# Patient Record
Sex: FEMALE | Race: BLACK OR AFRICAN AMERICAN | NOT HISPANIC OR LATINO | Employment: FULL TIME | URBAN - METROPOLITAN AREA
[De-identification: names, ages, dates, MRNs, and addresses within clinical notes are randomized per-mention and may not be internally consistent; named-entity substitution may affect disease eponyms.]

---

## 2024-10-16 ENCOUNTER — OFFICE VISIT (OUTPATIENT)
Dept: CARDIOLOGY | Facility: CLINIC | Age: 39
End: 2024-10-16
Payer: COMMERCIAL

## 2024-10-16 VITALS
HEART RATE: 69 BPM | HEIGHT: 67 IN | OXYGEN SATURATION: 99 % | WEIGHT: 204 LBS | DIASTOLIC BLOOD PRESSURE: 62 MMHG | BODY MASS INDEX: 32.02 KG/M2 | SYSTOLIC BLOOD PRESSURE: 118 MMHG

## 2024-10-16 DIAGNOSIS — Z01.810 PREOPERATIVE CARDIOVASCULAR EXAMINATION: Primary | ICD-10-CM

## 2024-10-16 PROCEDURE — 99204 OFFICE O/P NEW MOD 45 MIN: CPT

## 2024-10-16 PROCEDURE — 93005 ELECTROCARDIOGRAM TRACING: CPT

## 2024-10-16 PROCEDURE — 99214 OFFICE O/P EST MOD 30 MIN: CPT

## 2024-10-16 PROCEDURE — 1036F TOBACCO NON-USER: CPT

## 2024-10-16 RX ORDER — VIT C/E/ZN/COPPR/LUTEIN/ZEAXAN 250MG-90MG
25 CAPSULE ORAL DAILY
COMMUNITY

## 2024-10-16 RX ORDER — ASCORBIC ACID 500 MG
500 TABLET ORAL DAILY
COMMUNITY

## 2024-10-16 RX ORDER — ZINC GLUCONATE 50 MG
1 TABLET ORAL DAILY
COMMUNITY

## 2024-10-16 ASSESSMENT — PAIN SCALES - GENERAL: PAINLEVEL_OUTOF10: 0-NO PAIN

## 2024-10-16 NOTE — PROGRESS NOTES
Referred by OMID Ziegler, CNP  for Pre-op Cardiac Risk Stratification for Liposuction.      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     She reports that she exercises 4 days per week ( stair master 10-15 minutes, treadmill 20-30( walking with an incline and weight training for 30 minutes).  Patient denies chest pain and angina.  Pt denies shortness of breath, dyspnea on exertion, orthopnea, and paroxysmal nocturnal dyspnea.  Pt denies worsening lower extremity edema.  Pt denies palpitations or syncope.  No recent falls.  No fever or chills.  No cough.  No change in bowel or bladder habits.  No sick contacts.  No recent travel.    Review of Systems   All other systems reviewed and are negative.    Past Medical History:  PCOS, Right knee injury .     Past Surgical History:  Fibroid and polyp removal in uterus 2020, right arthroscopic knee surgery 2017      Social History:  She has no history on file for tobacco use, alcohol use, and drug use.    Family History:  No sudden cardiac death or premature heart failure.      Allergies:  Patient has no allergy information on record.    Outpatient Medications:  No current outpatient medications     Last Recorded Vitals:  Vitals:    10/16/24 0924   BP: 118/62   Pulse: 69   SpO2: 99%     Physical Exam  Constitutional:       Appearance: Normal appearance.   Neck:      Vascular: No carotid bruit.   Cardiovascular:      Rate and Rhythm: Normal rate and regular rhythm.      Heart sounds: No murmur heard.  Pulmonary:      Effort: Pulmonary effort is normal.      Breath sounds: Normal breath sounds.   Abdominal:      Palpations: Abdomen is soft.   Skin:     General: Skin is warm.   Neurological:      Mental Status: She is alert and oriented to person, place, and time.   Psychiatric:         Mood and  "Affect: Mood normal.       Last Labs:  CBC -  No results found for: \"WBC\", \"HGB\", \"HCT\", \"MCV\", \"PLT\"    CMP -  No results found for: \"CALCIUM\", \"PHOS\", \"PROT\", \"ALBUMIN\", \"AST\", \"ALT\", \"ALKPHOS\", \"BILITOT\"    LIPID PANEL -   No results found for: \"CHOL\", \"TRIG\", \"HDL\", \"CHHDL\", \"LDLF\", \"VLDL\", \"NHDL\"    RENAL FUNCTION PANEL -   No results found for: \"GLUCOSE\", \"NA\", \"K\", \"CL\", \"CO2\", \"ANIONGAP\", \"BUN\", \"CREATININE\", \"GFRMALE\", \"CALCIUM\", \"PHOS\", \"ALBUMIN\"     Lab Results   Component Value Date    HGBA1C 5.2 03/17/2022       Last Cardiology Tests:  ECG:  NSR     Assessment/Plan   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     She reports that she exercises 4 days per week ( stair master 10-15 minutes, treadmill 20-30( walking with an incline and weight training for 30 minutes).  She denies cardiac symptoms. Her functional capacity is > 4 METS. EKG with non-specific T wave and q waves not present verified with Cardiologist Dr. Green. She is a low cardiac risk for Liposuction .    Shira Rodriguez, APRN-CNP      Thank you for allowing me to participate in their care.  Please feel free to call me with any further questions or concerns.    Tres Green MD, FAC, [facility]ANTANANC  Division of Cardiovascular Medicine  System Director, Nuclear Cardiology   Medical Director, [facility] [place], [facility]   Clinical , [facility] [employer]  [email or web address]   Office:  [phone]        I have reviewed the [facility]'s encounter note, approve the [facility]'s documentation and the diagnostic and therapeutic plan.     "

## 2024-10-25 LAB
ATRIAL RATE: 69 BPM
P AXIS: 50 DEGREES
P OFFSET: 191 MS
P ONSET: 141 MS
PR INTERVAL: 144 MS
Q ONSET: 213 MS
QRS COUNT: 11 BEATS
QRS DURATION: 92 MS
QT INTERVAL: 366 MS
QTC CALCULATION(BAZETT): 392 MS
QTC FREDERICIA: 383 MS
R AXIS: 13 DEGREES
T AXIS: -4 DEGREES
T OFFSET: 396 MS
VENTRICULAR RATE: 69 BPM